# Patient Record
Sex: MALE | Race: WHITE | NOT HISPANIC OR LATINO
[De-identification: names, ages, dates, MRNs, and addresses within clinical notes are randomized per-mention and may not be internally consistent; named-entity substitution may affect disease eponyms.]

---

## 2022-03-23 PROBLEM — Z00.00 ENCOUNTER FOR PREVENTIVE HEALTH EXAMINATION: Status: ACTIVE | Noted: 2022-03-23

## 2022-03-31 ENCOUNTER — APPOINTMENT (OUTPATIENT)
Dept: COLORECTAL SURGERY | Facility: CLINIC | Age: 67
End: 2022-03-31
Payer: MEDICARE

## 2022-03-31 VITALS
SYSTOLIC BLOOD PRESSURE: 128 MMHG | HEIGHT: 72 IN | DIASTOLIC BLOOD PRESSURE: 78 MMHG | WEIGHT: 193 LBS | HEART RATE: 70 BPM | BODY MASS INDEX: 26.14 KG/M2 | OXYGEN SATURATION: 97 %

## 2022-03-31 PROCEDURE — 46600 DIAGNOSTIC ANOSCOPY SPX: CPT

## 2022-03-31 PROCEDURE — 99204 OFFICE O/P NEW MOD 45 MIN: CPT

## 2022-03-31 NOTE — ASSESSMENT
[FreeTextEntry1] : Mr. Mendoza presents to the office with a perianal lump of several months duration. It is asymptomatic but as pt is curious as to the nature of this lesion, he is here for evaluation. [facility] and anoscopy were otherwise unremarkable and visual inspection revealed a 1 cm perineal epidermal inclusion cyst. Given its small size, asymptomatic nature and benign nature, I have recommended no surgical intervention and reassured him that this is not a lesion to be concerned with. Should the cyst increase in size, he can then return to the office to discuss possible removal, whether at bedside or in operative setting. Pt understands and is agreeable.

## 2022-03-31 NOTE — HISTORY OF PRESENT ILLNESS
[FreeTextEntry1] : Mr. Mendoza presents to the office with a perianal lump that has been present for several months. He initially noted it during bathing. It is nontender and has not increased in size and does not bleed. He reports BMs that are passed fairly regularly and without issue. Last colonoscopy within 5 years ([facility]).

## 2022-03-31 NOTE — PHYSICAL EXAM
[Normal rectal exam] : exam was normal [Reduce Spontaneously] : a spontaneously reducible (grade II) [Skin Tags] : there were no residual hemorrhoidal skin tags seen [Normal] : was normal [None] : there was no rectal mass  [Gross Blood] : no gross blood [No Rash or Lesion] : No rash or lesion [Alert] : alert [Oriented to Person] : oriented to person [Oriented to Place] : oriented to place [Oriented to Time] : oriented to time [Calm] : calm [de-identified] : perineal 1 cm epidermal inclusion cyst [de-identified] : No apparent distress [de-identified] : Normocephalic atraumatic [de-identified] : Moving all extremities x4

## 2022-12-16 ENCOUNTER — NON-APPOINTMENT (OUTPATIENT)
Age: 67
End: 2022-12-16

## 2022-12-16 ENCOUNTER — APPOINTMENT (OUTPATIENT)
Dept: COLORECTAL SURGERY | Facility: CLINIC | Age: 67
End: 2022-12-16

## 2022-12-16 VITALS
HEART RATE: 81 BPM | HEIGHT: 72 IN | OXYGEN SATURATION: 96 % | WEIGHT: 195 LBS | SYSTOLIC BLOOD PRESSURE: 127 MMHG | RESPIRATION RATE: 14 BRPM | DIASTOLIC BLOOD PRESSURE: 78 MMHG | BODY MASS INDEX: 26.41 KG/M2 | TEMPERATURE: 97.2 F

## 2022-12-16 DIAGNOSIS — K60.3 ANAL FISTULA: ICD-10-CM

## 2022-12-16 DIAGNOSIS — L72.0 EPIDERMAL CYST: ICD-10-CM

## 2022-12-16 PROCEDURE — 10060 I&D ABSCESS SIMPLE/SINGLE: CPT

## 2022-12-16 PROCEDURE — 99214 OFFICE O/P EST MOD 30 MIN: CPT | Mod: 25

## 2022-12-19 PROBLEM — K60.3 ANAL FISTULA: Status: ACTIVE | Noted: 2022-12-19

## 2022-12-19 PROBLEM — L72.0 EPIDERMAL INCLUSION CYST: Status: ACTIVE | Noted: 2022-03-31

## 2022-12-19 NOTE — PHYSICAL EXAM
[Normal rectal exam] : exam was normal [Reduce Spontaneously] : a spontaneously reducible (grade II) [Normal] : was normal [None] : there was no rectal mass  [No Rash or Lesion] : No rash or lesion [Alert] : alert [Oriented to Person] : oriented to person [Oriented to Place] : oriented to place [Oriented to Time] : oriented to time [Calm] : calm [Skin Tags] : there were no residual hemorrhoidal skin tags seen [Gross Blood] : no gross blood [de-identified] : perineal 1 cm epidermal inclusion cyst; distal to cyst is small opening comprised of granulation tissue that bleeds [de-identified] : No apparent distress [de-identified] : Normocephalic atraumatic [de-identified] : Moving all extremities x4

## 2022-12-19 NOTE — HISTORY OF PRESENT ILLNESS
[FreeTextEntry1] : Mr. Mendoza presents to the office with a perianal lump that has been present for several months. He initially noted it during bathing. It is nontender and has not increased in size and does not bleed. He reports BMs that are passed fairly regularly and without issue. Last colonoscopy within 5 years ([facility]). \par \par 12/16/22 Mr. Mendoza returns to the office for followup. He notes that 1 week earlier, he began experiencing blood from what he believes is a hemorrhoid adjacent to his known perineal cyst. No issues with BMs and no anorectal pain. Here for further evaluation and treatment.

## 2022-12-19 NOTE — ASSESSMENT
[FreeTextEntry1] : Mr. Mendoza presents to the office with a perianal lump of several months duration. It is asymptomatic but as pt is curious as to the nature of this lesion, he is here for evaluation. DANIELE and anoscopy were otherwise unremarkable and visual inspection revealed a 1 cm perineal epidermal inclusion cyst. Given its small size, asymptomatic nature and benign nature, I have recommended no surgical intervention and reassured him that this is not a lesion to be concerned with. Should the cyst increase in size, he can then return to the office to discuss possible removal, whether at bedside or in operative setting. Pt understands and is agreeable. \par \par 12/16/22 Mr. Mendoza returns to the office for followup. Since his last offce appt, he reports, interval development  of an area adjacent to his known perineal cyst which contains granulation tissue and is bleeding. In office today, the epidermal inclusion cyst was excised under local anesthesia and the site as well as the granulation tissue was cauterized with silver nitrate. He was advised on wound care and to use medi-honey to ensure closure of the wound. Pt understands and is agreeable.

## 2023-09-05 ENCOUNTER — NON-APPOINTMENT (OUTPATIENT)
Age: 68
End: 2023-09-05

## 2024-08-28 ENCOUNTER — APPOINTMENT (OUTPATIENT)
Dept: GASTROENTEROLOGY | Facility: CLINIC | Age: 69
End: 2024-08-28
Payer: MEDICARE

## 2024-08-28 VITALS
WEIGHT: 205 LBS | DIASTOLIC BLOOD PRESSURE: 70 MMHG | HEIGHT: 72 IN | BODY MASS INDEX: 27.77 KG/M2 | SYSTOLIC BLOOD PRESSURE: 130 MMHG

## 2024-08-28 DIAGNOSIS — Z12.11 ENCOUNTER FOR SCREENING FOR MALIGNANT NEOPLASM OF COLON: ICD-10-CM

## 2024-08-28 PROCEDURE — 99203 OFFICE O/P NEW LOW 30 MIN: CPT

## 2024-08-28 RX ORDER — CABERGOLINE 0.5 MG/1
TABLET ORAL
Refills: 0 | Status: ACTIVE | COMMUNITY

## 2024-08-28 RX ORDER — ASPIRIN 81 MG
81 TABLET,CHEWABLE ORAL
Refills: 0 | Status: ACTIVE | COMMUNITY

## 2024-08-28 RX ORDER — ATORVASTATIN CALCIUM 10 MG/1
10 TABLET, FILM COATED ORAL
Refills: 0 | Status: ACTIVE | COMMUNITY

## 2024-08-28 NOTE — PHYSICAL EXAM

## 2024-08-28 NOTE — HISTORY OF PRESENT ILLNESS
[FreeTextEntry1] : Mr. MELO MORENO is a 68 year old male presents for screening colonoscopy. Patient has no complaints of bowel issues, bleeding, abdominal pain, family history of colon cancer, GERD symptoms.  Patient had last colonoscopy several years ago.  Patient has a history of an anal fistula.

## 2024-11-26 ENCOUNTER — APPOINTMENT (OUTPATIENT)
Dept: GASTROENTEROLOGY | Facility: AMBULATORY MEDICAL SERVICES | Age: 69
End: 2024-11-26
Payer: MEDICARE

## 2024-11-26 PROCEDURE — 45378 DIAGNOSTIC COLONOSCOPY: CPT
